# Patient Record
Sex: MALE | Race: WHITE
[De-identification: names, ages, dates, MRNs, and addresses within clinical notes are randomized per-mention and may not be internally consistent; named-entity substitution may affect disease eponyms.]

---

## 2017-10-04 ENCOUNTER — HOSPITAL ENCOUNTER (OUTPATIENT)
Dept: HOSPITAL 47 - RADCTMAIN | Age: 37
Discharge: HOME | End: 2017-10-04
Attending: OTOLARYNGOLOGY
Payer: COMMERCIAL

## 2017-10-04 DIAGNOSIS — R13.10: Primary | ICD-10-CM

## 2017-10-04 DIAGNOSIS — K23: ICD-10-CM

## 2017-10-04 PROCEDURE — 70491 CT SOFT TISSUE NECK W/DYE: CPT

## 2017-10-04 NOTE — CT
EXAMINATION TYPE: CT soft tissue neck w con

 

DATE OF EXAM: 10/4/2017

 

COMPARISON: Correlation barium swallow 9/25/2017

 

HISTORY: 37-year-old male complains of dysphagia.  Patient states food feels like it gets stuck at le
anh of sternal notch.

 

TECHNIQUE: Contiguous axial scanning of the soft tissues of the neck performed with IV Contrast, emil
ent injected with 100 mL of Omnipaque 300. Coronal/sagittal reconstructions performed.

 

CT DLP: 717.3 mGycm

Automated exposure control for dose reduction was used.

 

FINDINGS: 

Visualized intracranial structures, orbits and globes, and mastoid air cells appear within normal whit[place]
its. Mild mucosal thickening throughout the ethmoid air cells and a polyp or mucosal retention cyst i
n the right maxillary sinus.

 

The nasopharynx is clear.

Mild hypertrophy of the bilateral palatine tonsils. Oropharynx otherwise clear.

Prevertebral soft tissues and epiglottis are within normal limits.

The glottic and subglottic structures as well as the tracheal column is clear. Mild emphysematous vidal
nge in the upper lungs.

 

Thyroid, submandibular, and parotid glands appear satisfactory. A couple prominent upper cervical lym
ph nodes in station 2A measuring up to 1.3 cm but still remain nonenlarged by CT size criteria. No ortega
spicious neck mass seen.

 

We do note a focal deviation of the esophagus towards the left just below the level of the thoracic i
nlet, coronal image 50 as it assumes a position located left lateral to the trachea, axial image 19. 
No abnormal mass is seen to account for this finding but there may be some slight extrinsic mass effe
ct from the trachea itself.

 

 

 

IMPRESSION: 

 

1. THE ESOPHAGUS DEVIATES TOWARDS THE LEFT ASSUMING A POSITION LEFT LATERAL TO THE TRACHEA JUST BELOW
 THE THORACIC INLET. NO DISPLACING MASS ACCOUNTS FOR THIS FINDING. THERE MAY BE SLIGHT MASS EFFECT ON
TO THE ESOPHAGUS FROM THE TRACHEA ITSELF.

2. MILD EMPHYSEMATOUS CHANGE.

## 2021-06-04 ENCOUNTER — HOSPITAL ENCOUNTER (OUTPATIENT)
Dept: HOSPITAL 47 - RADUSWWP | Age: 41
Discharge: HOME | End: 2021-06-04
Attending: STUDENT IN AN ORGANIZED HEALTH CARE EDUCATION/TRAINING PROGRAM
Payer: COMMERCIAL

## 2021-06-04 DIAGNOSIS — I83.812: Primary | ICD-10-CM

## 2021-06-04 NOTE — US
EXAMINATION TYPE: US venous doppler duplex LE LT

 

DATE OF EXAM: 6/4/2021 1:14 PM

 

COMPARISON: Prior left lower extremity venous ultrasound 2/19/2012

 

CLINICAL HISTORY: LLE M79.662 Pain of left calf.

 

SIDE PERFORMED: Left  

 

TECHNIQUE:  The lower extremity deep venous system is examined utilizing real time linear array sonog
dashawn with graded compression, doppler sonography and color-flow sonography.

 

VESSELS IMAGED:

Common Femoral Vein

Deep Femoral Vein

Greater Saphenous Vein *

Femoral Vein

Popliteal Vein

Small Saphenous Vein *

Proximal Calf Veins

(* superficial vessels)

 

 

 

 

 

Left Leg:  Negative for DVT

 

Area of concern , left lateral leg just below knee, was scanned. There are thrombosed varicosities no
lilian.

 

Grayscale, color doppler, spectral doppler imaging performed of the deep veins of the left lower extr
emity.  There is normal flow, compressibility, vascular waveforms.

 

 

IMPRESSION:  No ultrasound evidence for acute DVT in the left lower extremity. Thrombosed superficial
 varicose vein noted towards the end of study.